# Patient Record
Sex: MALE | Race: WHITE | NOT HISPANIC OR LATINO | Employment: FULL TIME | URBAN - METROPOLITAN AREA
[De-identification: names, ages, dates, MRNs, and addresses within clinical notes are randomized per-mention and may not be internally consistent; named-entity substitution may affect disease eponyms.]

---

## 2021-06-21 ENCOUNTER — TRANSFERRED RECORDS (OUTPATIENT)
Dept: HEALTH INFORMATION MANAGEMENT | Facility: CLINIC | Age: 56
End: 2021-06-21

## 2022-06-21 ENCOUNTER — TRANSFERRED RECORDS (OUTPATIENT)
Dept: HEALTH INFORMATION MANAGEMENT | Facility: CLINIC | Age: 57
End: 2022-06-21

## 2022-06-22 ENCOUNTER — TRANSCRIBE ORDERS (OUTPATIENT)
Dept: OTHER | Age: 57
End: 2022-06-22

## 2022-06-22 DIAGNOSIS — T83.9XXA DIFFICULT FOLEY CATHETER PLACEMENT (H): Primary | ICD-10-CM

## 2022-06-23 ENCOUNTER — TELEPHONE (OUTPATIENT)
Dept: UROLOGY | Facility: CLINIC | Age: 57
End: 2022-06-23

## 2022-06-23 NOTE — TELEPHONE ENCOUNTER
[facility] Call Center    Phone Message    May a detailed message be left on voicemail: yes     Reason for Call: Other: Patient is being referred to Urology for Difficult Gutierrez catheter placement , please triage and call patient to schedule      Action Taken: Message routed to:  [facility] ([facility] Urology     Travel Screening: Not Applicable

## 2022-07-20 ENCOUNTER — TRANSFERRED RECORDS (OUTPATIENT)
Dept: HEALTH INFORMATION MANAGEMENT | Facility: CLINIC | Age: 57
End: 2022-07-20

## 2022-08-01 NOTE — TELEPHONE ENCOUNTER
Palak from referring providers office called and stated pt is still waiting on a call back from referral - please call pt to further discuss, thanks!

## 2022-08-02 NOTE — TELEPHONE ENCOUNTER
I called and left a detailed VM that since [place] this appointment needs to be in person and not a video visit. Asked the patient to call back and get scheduled with Dr Kentrell Corey.

## 2022-08-03 ENCOUNTER — PRE VISIT (OUTPATIENT)
Dept: UROLOGY | Facility: CLINIC | Age: 57
End: 2022-08-03

## 2022-08-03 NOTE — TELEPHONE ENCOUNTER
Action August 3, 2022 JTv 2:09 PM    Action Taken CSS sent an urgent request to [facility] for records.      Action 2022 JTV 8:08 AM    Action Taken CSS received records from [facility].  Records have been sent to scanning for processing. Copies of records sent to provider's email for review. Rooming Staff and Nurse was CC'd in the email as well.        MEDICAL RECORDS REQUEST   [facility]  Urology Clinic  [address] [place] [address]  PHONE: [phone]  Fax: [phone]        FUTURE VISIT INFORMATION                                                   Roshan T Dayanna, : 1965 scheduled for future visit at [facility]    APPOINTMENT INFORMATION:    Date: 2022    Provider:  Kentrell Corey MD    Reason for Visit/Diagnosis: Difficult Gutierrez catheter placement     REFERRAL INFORMATION:    Referring provider:  Dr Leroy Hagen @ [facility]    Clinic contact number:  Phone [phone], Fax [phone]    RECORDS REQUESTED FOR VISIT                                                     NOTES  STATUS/DETAILS   OFFICE NOTE from referring provider Sent to scanning and provider's email on 2022 Yes, 2022, 2022 -- [facility]   Cystoscopy  Yes, 2022 -- [facility]    MEDICATION LIST  Yes, [facility]      PRE-VISIT CHECKLIST      Record collection complete yes   Appointment appropriately scheduled           (right time/right provider) Yes   Joint diagnostic appointment coordinated correctly          (ensure right order & amount of time) Yes   MyChart activation No   Questionnaire complete If no, please explain pending

## 2022-08-04 ENCOUNTER — PRE VISIT (OUTPATIENT)
Dept: UROLOGY | Facility: CLINIC | Age: 57
End: 2022-08-04

## 2022-08-04 NOTE — TELEPHONE ENCOUNTER
Reason for visit: Consult     Dx/Hx/Sx: Difficult Gutierrez catheter placement    Records/imaging/labs/orders: In EPIC    At Rooming: cath supplies?

## 2022-11-12 NOTE — PROGRESS NOTES
"HPI:  Roshan Alan is a 57 year old male with shunted SB being seen for evaluation. He has a a catheterizable channel and has had difficulty cathing.      Cysto in June at OSH by Dr. Mahajan showed a tortuous channel and catheter had to be placed over a wire     Around age 13 - ileal conduit with Dr. Flores in [place] for \"bladder spasticity\"   Around age 25 - Dr. Blankenship put in AUS which was removed due to unclear reasons  Cystoprostatectomy, pouch creation with continent catheterizable channel in 1998     Had been doing well cathing through his pouch. Then a few months ago developed difficulty with CIC. Eventually could not get any catheter in   Now getting cath changes over a wire once per week     Previously cathing q3-4 hours, could hold up to 1L   cathing with 18fr catheters   Irrigates BID due to mucus   On loperamide for diarrhea, says he has short amount of intestine which causes diarrhea     PSH:   As above plus had a bowel obstruction at one point requiring open repair     Exam:  BP (!) 157/97   Pulse 96   Ht 1.626 m (5' 4\")   Wt 63.5 kg (140 lb)   BMI 24.03 kg/m    GENERAL: Healthy, alert and no distress  EYES: Eyes grossly normal to inspection.  No discharge or erythema  RESP: No audible wheeze, cough, or visible cyanosis.  NEURO: Alert and oriented x3  ABD: several well healed scars from prior surgeries including a low midline, and a low horizontal scar   :  catheter through RLQ catheterizable channel       Assessment & Plan   1. 57yoM shunted SB with urologic history that sounds like cystoprostatectomy, and urinary pouch with CCC. He is now having difficulty with getting his catheters in due to tortuosity of the channel and possible false passage. Last cath exchange was last week    We discussed that we would like the channel to rest before deciding how to revise it as having a catheter through it can cover up some of the problem. We will place SPT and cysto the channel at the " same time. He has a recent CT scan so we will need to see the images for this to see if there is a good window     - schedule cysto/SPT placement   - after this will need channel rest and then re-scope. Then can plan revision of the channel   - obtain outside CT images    Kentrell Corey MD  [facility] [place]      ==========================      Additional Coding Information:    Problems:  4 -- one undiagnosed new problem with uncertain prognosis    Data Reviewed  Review of external notes as documented above     Level of risk:  4 -- minor surgery with patient or procedure risks    Time spent:  35 minutes spent on the date of the encounter doing chart review, history and exam, documentation and further activities per the note

## 2022-11-14 ENCOUNTER — OFFICE VISIT (OUTPATIENT)
Dept: UROLOGY | Facility: CLINIC | Age: 57
End: 2022-11-14
Payer: MEDICARE

## 2022-11-14 VITALS
HEART RATE: 96 BPM | WEIGHT: 140 LBS | SYSTOLIC BLOOD PRESSURE: 157 MMHG | DIASTOLIC BLOOD PRESSURE: 97 MMHG | HEIGHT: 64 IN | BODY MASS INDEX: 23.9 KG/M2

## 2022-11-14 DIAGNOSIS — N31.9 NEUROGENIC BLADDER: ICD-10-CM

## 2022-11-14 DIAGNOSIS — Q05.4 SPINA BIFIDA WITH HYDROCEPHALUS, UNSPECIFIED SPINAL REGION (H): ICD-10-CM

## 2022-11-14 PROBLEM — Q05.9 SPINA BIFIDA (H): Status: ACTIVE | Noted: 2022-11-14

## 2022-11-14 PROCEDURE — 99204 OFFICE O/P NEW MOD 45 MIN: CPT | Performed by: STUDENT IN AN ORGANIZED HEALTH CARE EDUCATION/TRAINING PROGRAM

## 2022-11-14 ASSESSMENT — PAIN SCALES - GENERAL: PAINLEVEL: NO PAIN (0)

## 2022-11-14 NOTE — LETTER
"11/14/2022       RE: Roshan Alan  [address]  [place] [address]     Dear Colleague,    Thank you for referring your patient, Roshan Alan, to the [facility] UROLOG[facility]IC [place] at [facility]. Please see a copy of my visit note below.    HPI:  Roshan Alan is a 57 year old male with shunted SB being seen for evaluation. He has a a catheterizable channel and has had difficulty cathing.      Cysto in June at OSH by Dr. Mahajan showed a tortuous channel and catheter had to be placed over a wire     Around age 13 - ileal conduit with Dr. Flores in [place] for \"bladder spasticity\"   Around age 25 - Dr. Blankenship put in AUS which was removed due to unclear reasons  Cystoprostatectomy, pouch creation with continent catheterizable channel in 1998     Had been doing well cathing through his pouch. Then a few months ago developed difficulty with CIC. Eventually could not get any catheter in   Now getting cath changes over a wire once per week     Previously cathing q3-4 hours, could hold up to 1L   cathing with 18fr catheters   Irrigates BID due to mucus   On loperamide for diarrhea, says he has short amount of intestine which causes diarrhea     PSH:   As above plus had a bowel obstruction at one point requiring open repair     Exam:  BP (!) 157/97   Pulse 96   Ht 1.626 m (5' 4\")   Wt 63.5 kg (140 lb)   BMI 24.03 kg/m    GENERAL: Healthy, alert and no distress  EYES: Eyes grossly normal to inspection.  No discharge or erythema  RESP: No audible wheeze, cough, or visible cyanosis.  NEURO: Alert and oriented x3  ABD: several well healed scars from prior surgeries including a low midline, and a low horizontal scar   :  catheter through RLQ catheterizable channel       Assessment & Plan   1. 57yoM shunted SB with urologic history that sounds like cystoprostatectomy, and urinary pouch with CCC. He is now having difficulty with getting his " catheters in due to tortuosity of the channel and possible false passage. Last cath exchange was last week    We discussed that we would like the channel to rest before deciding how to revise it as having a catheter through it can cover up some of the problem. We will place SPT and cysto the channel at the same time. He has a recent CT scan so we will need to see the images for this to see if there is a good window     - schedule cysto/SPT placement   - after this will need channel rest and then re-scope. Then can plan revision of the channel   - obtain outside CT images    Kentrell Corey MD  [facility]OLOGY [facility]INIC [place]      ==========================      Additional Coding Information:    Problems:  4 -- one undiagnosed new problem with uncertain prognosis    Data Reviewed  Review of external notes as documented above     Level of risk:  4 -- minor surgery with patient or procedure risks    Time spent:  35 minutes spent on the date of the encounter doing chart review, history and exam, documentation and further activities per the note

## 2022-11-14 NOTE — NURSING NOTE
"Chief Complaint   Patient presents with     Consult       Blood pressure (!) 157/97, pulse 96, height 1.626 m (5' 4\"), weight 63.5 kg (140 lb). Body mass index is 24.03 kg/m .    There is no problem list on file for this patient.      No Known Allergies    Current Outpatient Medications   Medication Sig Dispense Refill     omeprazole (PRILOSEC) 20 MG DR capsule Take 20 mg by mouth         Social History     Tobacco Use     Smoking status: Every Day     Types: Cigarettes, Vaping Device     Smokeless tobacco: Former       Ailyn Goldsmith  11/14/2022  3:34 PM  "

## 2022-11-15 ENCOUNTER — DOCUMENTATION ONLY (OUTPATIENT)
Dept: UROLOGY | Facility: CLINIC | Age: 57
End: 2022-11-15

## 2022-11-15 NOTE — PROGRESS NOTES
Action 11/15/22 MMT   Action Taken  [facility] faxed urgent request to Buddy Richard for OP notes and imaging.     CSS faxed urgent request to Luba Wiggins to push images to PACS.

## 2023-09-12 ENCOUNTER — TELEPHONE (OUTPATIENT)
Dept: UROLOGY | Facility: CLINIC | Age: 58
End: 2023-09-12
Payer: MEDICARE

## 2023-09-12 DIAGNOSIS — N31.9 NEUROGENIC BLADDER: Primary | ICD-10-CM

## 2023-09-12 NOTE — TELEPHONE ENCOUNTER
Scheduled surgery with Dr. Trevino on 10/11    Spoke with: Patient    Surgery is located at [facility]    Patient will be seen for their H&P by:    PCP Dr. Lázaro Toledo within 30 days of surgery - added to care team, would not let me add as PCP    Anesthesia type: General      Requested Imaging required for surgery: NA    Patient needs scheduled for 4 weeks with kwame or fellow for office cysto    Patient will receive their packet via mail per their preference - Confirmed address on file is up to date    Additional comments: JESENIA Horton on 9/12/2023 at 3:38 PM

## 2023-09-25 DIAGNOSIS — N31.9 NEUROGENIC BLADDER: Primary | ICD-10-CM

## 2023-09-27 ENCOUNTER — TRANSFERRED RECORDS (OUTPATIENT)
Dept: HEALTH INFORMATION MANAGEMENT | Facility: CLINIC | Age: 58
End: 2023-09-27
Payer: MEDICARE

## 2023-10-05 ENCOUNTER — TELEPHONE (OUTPATIENT)
Dept: UROLOGY | Facility: CLINIC | Age: 58
End: 2023-10-05
Payer: MEDICARE

## 2023-10-05 ENCOUNTER — TRANSFERRED RECORDS (OUTPATIENT)
Dept: HEALTH INFORMATION MANAGEMENT | Facility: CLINIC | Age: 58
End: 2023-10-05
Payer: MEDICARE

## 2023-10-05 LAB
CREATININE (EXTERNAL): 0.6 MG/DL (ref 0.7–1.3)
GLUCOSE (EXTERNAL): 87 MG/DL (ref 70–99)
POTASSIUM (EXTERNAL): 4.1 MMOL/L (ref 3.5–5.1)

## 2023-10-05 RX ORDER — LOPERAMIDE HYDROCHLORIDE 2 MG/1
2 TABLET ORAL 2 TIMES DAILY PRN
COMMUNITY

## 2023-10-05 NOTE — TELEPHONE ENCOUNTER
Spoke with Roshan regarding pre-op physical and urine culture.  He completed his physical today and has been cleared for surgery, those records have been requested.  His urine culture has had no growth.  He stated that he understood all of the information in the surgery packet, and did not have any other questions regarding surgery prep.  This writer let him know that scheduling will reach out to him a couple days before surgery to confirm the surgery time.  Roshan has my direct number if her has any other questions or concerns.      Maureen Ingram RN   8:35 AM

## 2023-10-10 ENCOUNTER — ANESTHESIA EVENT (OUTPATIENT)
Dept: SURGERY | Facility: AMBULATORY SURGERY CENTER | Age: 58
End: 2023-10-10
Payer: MEDICARE

## 2023-10-10 NOTE — ANESTHESIA PREPROCEDURE EVALUATION
Anesthesia Pre-Procedure Evaluation    Patient: Roshan Alan   MRN: [id number] : 1965        Procedure : Procedure(s):  CYSTOSCOPY, WITH SUPRAPUBIC CATHETER INSERTION          No past medical history on file.   No past surgical history on file.   No Known Allergies   Social History     Tobacco Use    Smoking status: Every Day     Types: Cigarettes, Vaping Device    Smokeless tobacco: Former   Substance Use Topics    Alcohol use: Not on file      Wt Readings from Last 1 Encounters:   22 63.5 kg (140 lb)           Physical Exam    Airway        Mallampati: II   TM distance: > 3 FB   Neck ROM: full   Mouth opening: > 3 cm    Respiratory Devices and Support         Dental       (+) Minor Abnormalities - some fillings, tiny chips      Cardiovascular   cardiovascular exam normal          Pulmonary   pulmonary exam normal                OUTSIDE LABS:  CBC: No results found for: WBC, HGB, HCT, PLT  BMP: No results found for: NA, POTASSIUM, CHLORIDE, CO2, BUN, CR, GLC  COAGS: No results found for: PTT, INR, FIBR  POC: No results found for: BGM, HCG, HCGS  HEPATIC: No results found for: ALBUMIN, PROTTOTAL, ALT, AST, GGT, ALKPHOS, BILITOTAL, BILIDIRECT, JACLYN  OTHER: No results found for: PH, LACT, A1C, DRU, PHOS, MAG, LIPASE, AMYLASE, TSH, T4, T3, CRP, SED    Anesthesia Plan    ASA Status:  2    NPO Status:  NPO Appropriate    Anesthesia Type: MAC.     - Reason for MAC: straight local not clinically adequate   Induction: Intravenous, Propofol.   Maintenance: TIVA.        Consents    Anesthesia Plan(s) and associated risks, benefits, and realistic alternatives discussed. Questions answered and patient/representative(s) expressed understanding.     - Discussed:     - Discussed with:  Patient      - Extended Intubation/Ventilatory Support Discussed: No.      - Patient is DNR/DNI Status: No     Use of blood products discussed: No .     Postoperative Care    Pain management: IV analgesics, Oral pain medications,  Multi-modal analgesia.   PONV prophylaxis: Dexamethasone or Solumedrol, Ondansetron (or other 5HT-3), Background Propofol Infusion     Comments:           H&P reviewed: Unable to attach H&P to encounter due to EHR limitations. H&P Update: appropriate H&P reviewed, patient examined. No interval changes since H&P (within 30 days).         Isaac Villagomez MD

## 2023-10-11 ENCOUNTER — HOSPITAL ENCOUNTER (OUTPATIENT)
Facility: AMBULATORY SURGERY CENTER | Age: 58
Discharge: HOME | End: 2023-10-11
Attending: UROLOGY
Payer: MEDICARE

## 2023-10-11 ENCOUNTER — ANESTHESIA (OUTPATIENT)
Dept: SURGERY | Facility: AMBULATORY SURGERY CENTER | Age: 58
End: 2023-10-11
Payer: MEDICARE

## 2023-10-11 VITALS
DIASTOLIC BLOOD PRESSURE: 73 MMHG | BODY MASS INDEX: 23.9 KG/M2 | OXYGEN SATURATION: 99 % | RESPIRATION RATE: 14 BRPM | HEIGHT: 64 IN | HEART RATE: 85 BPM | TEMPERATURE: 97.6 F | WEIGHT: 140 LBS | SYSTOLIC BLOOD PRESSURE: 117 MMHG

## 2023-10-11 DIAGNOSIS — N31.9 NEUROGENIC BLADDER: Primary | ICD-10-CM

## 2023-10-11 PROCEDURE — 51102 DRAIN BL W/CATH INSERTION: CPT

## 2023-10-11 PROCEDURE — 51102 DRAIN BL W/CATH INSERTION: CPT | Mod: 22 | Performed by: UROLOGY

## 2023-10-11 PROCEDURE — 76942 ECHO GUIDE FOR BIOPSY: CPT | Mod: 26 | Performed by: UROLOGY

## 2023-10-11 RX ORDER — OXYCODONE HYDROCHLORIDE 5 MG/1
10 TABLET ORAL
Status: DISCONTINUED | OUTPATIENT
Start: 2023-10-11 | End: 2023-10-12 | Stop reason: HOSPADM

## 2023-10-11 RX ORDER — HYDROCODONE BITARTRATE AND ACETAMINOPHEN 5; 325 MG/1; MG/1
1 TABLET ORAL ONCE
Status: CANCELLED | OUTPATIENT
Start: 2023-10-11 | End: 2023-10-11

## 2023-10-11 RX ORDER — SODIUM CHLORIDE, SODIUM LACTATE, POTASSIUM CHLORIDE, CALCIUM CHLORIDE 600; 310; 30; 20 MG/100ML; MG/100ML; MG/100ML; MG/100ML
INJECTION, SOLUTION INTRAVENOUS CONTINUOUS
Status: DISCONTINUED | OUTPATIENT
Start: 2023-10-11 | End: 2023-10-12 | Stop reason: HOSPADM

## 2023-10-11 RX ORDER — CEFAZOLIN SODIUM 2 G/50ML
2 SOLUTION INTRAVENOUS SEE ADMIN INSTRUCTIONS
Status: DISCONTINUED | OUTPATIENT
Start: 2023-10-11 | End: 2023-10-12 | Stop reason: HOSPADM

## 2023-10-11 RX ORDER — DEXAMETHASONE SODIUM PHOSPHATE 4 MG/ML
INJECTION, SOLUTION INTRA-ARTICULAR; INTRALESIONAL; INTRAMUSCULAR; INTRAVENOUS; SOFT TISSUE PRN
Status: DISCONTINUED | OUTPATIENT
Start: 2023-10-11 | End: 2023-10-11

## 2023-10-11 RX ORDER — OXYCODONE HYDROCHLORIDE 5 MG/1
5 TABLET ORAL
Status: COMPLETED | OUTPATIENT
Start: 2023-10-11 | End: 2023-10-11

## 2023-10-11 RX ORDER — ONDANSETRON 2 MG/ML
4 INJECTION INTRAMUSCULAR; INTRAVENOUS EVERY 30 MIN PRN
Status: DISCONTINUED | OUTPATIENT
Start: 2023-10-11 | End: 2023-10-12 | Stop reason: HOSPADM

## 2023-10-11 RX ORDER — ACETAMINOPHEN 325 MG/1
975 TABLET ORAL ONCE
Status: COMPLETED | OUTPATIENT
Start: 2023-10-11 | End: 2023-10-11

## 2023-10-11 RX ORDER — ONDANSETRON 4 MG/1
4 TABLET, ORALLY DISINTEGRATING ORAL EVERY 30 MIN PRN
Status: DISCONTINUED | OUTPATIENT
Start: 2023-10-11 | End: 2023-10-12 | Stop reason: HOSPADM

## 2023-10-11 RX ORDER — LIDOCAINE HYDROCHLORIDE AND EPINEPHRINE 10; 10 MG/ML; UG/ML
INJECTION, SOLUTION INFILTRATION; PERINEURAL PRN
Status: DISCONTINUED | OUTPATIENT
Start: 2023-10-11 | End: 2023-10-11 | Stop reason: HOSPADM

## 2023-10-11 RX ORDER — PROPOFOL 10 MG/ML
INJECTION, EMULSION INTRAVENOUS CONTINUOUS PRN
Status: DISCONTINUED | OUTPATIENT
Start: 2023-10-11 | End: 2023-10-11

## 2023-10-11 RX ORDER — CEFAZOLIN SODIUM 2 G/50ML
2 SOLUTION INTRAVENOUS
Status: COMPLETED | OUTPATIENT
Start: 2023-10-11 | End: 2023-10-11

## 2023-10-11 RX ORDER — ONDANSETRON 2 MG/ML
INJECTION INTRAMUSCULAR; INTRAVENOUS PRN
Status: DISCONTINUED | OUTPATIENT
Start: 2023-10-11 | End: 2023-10-11

## 2023-10-11 RX ORDER — LIDOCAINE 40 MG/G
CREAM TOPICAL
Status: DISCONTINUED | OUTPATIENT
Start: 2023-10-11 | End: 2023-10-12 | Stop reason: HOSPADM

## 2023-10-11 RX ADMIN — SODIUM CHLORIDE, SODIUM LACTATE, POTASSIUM CHLORIDE, CALCIUM CHLORIDE: 600; 310; 30; 20 INJECTION, SOLUTION INTRAVENOUS at 06:30

## 2023-10-11 RX ADMIN — CEFAZOLIN SODIUM 2 G: 2 SOLUTION INTRAVENOUS at 07:25

## 2023-10-11 RX ADMIN — DEXAMETHASONE SODIUM PHOSPHATE 4 MG: 4 INJECTION, SOLUTION INTRA-ARTICULAR; INTRALESIONAL; INTRAMUSCULAR; INTRAVENOUS; SOFT TISSUE at 07:36

## 2023-10-11 RX ADMIN — ONDANSETRON 4 MG: 2 INJECTION INTRAMUSCULAR; INTRAVENOUS at 07:36

## 2023-10-11 RX ADMIN — OXYCODONE HYDROCHLORIDE 5 MG: 5 TABLET ORAL at 08:40

## 2023-10-11 RX ADMIN — PROPOFOL 150 MCG/KG/MIN: 10 INJECTION, EMULSION INTRAVENOUS at 07:35

## 2023-10-11 NOTE — ANESTHESIA CARE TRANSFER NOTE
Patient: Roshan Alan    Procedure: Procedure(s):  CYSTOSCOPY, WITH SUPRAPUBIC CATHETER INSERTION       Diagnosis: Neurogenic bladder [N31.9]  Diagnosis Additional Information: No value filed.    Anesthesia Type:   MAC     Note:    Oropharynx: spontaneously breathing and oropharynx clear of all foreign objects  Level of Consciousness: awake  Oxygen Supplementation: room air    Independent Airway: airway patency satisfactory and stable  Dentition: dentition unchanged  Vital Signs Stable: post-procedure vital signs reviewed and stable  Report to RN Given: handoff report given  Patient transferred to: Phase II  Comments: Uneventful transport   Report to OLLIE Zurita  Exchanging well; color natl  Pt responds appropriately to command  IV patent  Lips/teeth/dentition as preop status  Questions answered      Handoff Report: Identifed the Patient, Identified the Reponsible Provider, Reviewed the pertinent medical history, Discussed the surgical course, Reviewed Intra-OP anesthesia mangement and issues during anesthesia, Set expectations for post-procedure period and Allowed opportunity for questions and acknowledgement of understanding      Vitals:  Vitals Value Taken Time   /73 0833   Temp 97.3    Pulse 76    Resp 16    SpO2 98% room air        Electronically Signed By: DONAL POWER CRNA  October 11, 2023  8:35 AM

## 2023-10-11 NOTE — DISCHARGE INSTRUCTIONS
[facility]  Home Care Following Anesthesia  For 24 hours after surgery:  Get plenty of rest.  A responsible adult must stay with you for at least 24 hours after you leave the surgery center.  Do not drive or use heavy equipment.  If you have weakness or tingling, don't drive or use heavy equipment until this feeling goes away.   Do not drink alcohol.   Avoid strenuous or risky activities.  Ask for help when climbing stairs.  You may feel lightheaded.  IF so, sit for a few minutes before standing.  Have someone help you get up.   If you have nausea (feel sick to your stomach): Drink only clear liquids such as apple juice, ginger ale, broth or 7-Up.  Rest may also help.  Be sure to drink enough fluids.  Move to a regular diet as you feel able.   You may have a slight fever.  Call the doctor if your fever is over 100 F (37.7 C) (taken under the tongue) or lasts longer than 24 hours.  You may have a dry mouth, a sore throat, muscle aches or trouble sleeping. These should go away after 24 hours.  Do not make important or legal decisions.   It is recommended to avoid smoking.               Tips for taking pain medications  To get the best pain relief possible, remember these points:  Take pain medications as directed, before pain becomes severe.  Pain medication can upset your stomach: taking it with food may help.  Constipation is a common side effect of pain medication. Drink plenty of  fluids.  Eat foods high in fiber. Take a stool softener if recommended by your doctor or pharmacist.  Do not drink alcohol, drive or operate machinery while taking pain medications.  Ask about other ways to control pain, such as with heat, ice or relaxation.    Tylenol/Acetaminophen Consumption    If you feel your pain relief is insufficient, you may take Tylenol/Acetaminophen in addition to your narcotic pain medication.   Be careful not to exceed 4,000 mg of Tylenol/Acetaminophen in a 24 hour  period from all sources.  If you are taking extra strength Tylenol/acetaminophen (500 mg), the maximum dose is 8 tablets in 24 hours.  If you are taking regular strength acetaminophen (325 mg), the maximum dose is 12 tablets in 24 hours.    Call a doctor for any of the following:  Signs of infection (fever, growing tenderness at the surgery site, a large amount of drainage or bleeding, severe pain, foul-smelling drainage, redness, swelling).  It has been over 8 to 10 hours since surgery and you are still not able to urinate (pass water).  Headache for over 24 hours.  Signs of Covid-19 infection (temperature over 100 degrees, shortness of breath, cough, loss of taste/smell, generalized body aches, persistent headache, chills, sore throat, nausea/vomiting/diarrhea)  Your doctor is:  Dr. Todd Barrientos, Prostate and Urology: [phone]              Or dial [phone] and ask for the resident on call for:  Prostate Urology  For emergency care, call the:  [facility] Department:  [phone] (TTY for hearing impaired: [phone])

## 2023-10-11 NOTE — OR NURSING
Catheter removed from right lower abdomen per Dr. Barrientos no new catheter placed in this location

## 2023-10-11 NOTE — OP NOTE
PREOPERATIVE DIAGNOSIS: neurogenic bladder and difficulty catheterizing efferent limb of [place] Pouch.   POSTOPERATIVE DIAGNOSIS: same  PROCEDURE PERFORMED: Suprapubic cystostomy tube placement (930275-88) Pelvic Ultrasound with focus on the bladder (48083-31) Cystoscopy (97593-86)    MODIFIER 22: cysto was made more difficult by extremely redundant cath channel such that it took almost 20 minutes to find the bladder -- 10 times longer than comparable cases. SPT placement made more difficult by tremendous amount of mucous in the bladder whichmade it very difficult to visualize the interior of the pouch on either cysto or ultrasound and these made visualization for SPT placement much more difficult -- taking 3x longer than normal.   SURGEON: Todd Trevino MD  RESIDENT: none  ESTIMATED BLOOD LOSS: Minimal, less than 5 mL.   TUBES AND DRAINS: A 16-Tamazight Gutierrez catheter with 10 mL and balloon.   COMPLICATIONS: None.   ANESTHESIA: MAC and Local  BRIEF HISTORY OF PRESENT ILLNESS: neurogenic bladder s/p ileal conduit and then conduit removal, cystectomy, with creation of continent pouch. Now has 1-2 years of difficult catheterizing the pouch which has required him to have an indwelling catheter in the channel exchanged over a wire.     We discussed options, and recommendations for suprapubic tube for bladder drainage. Patient understood risks (including bowel or vascular injury), benefits and alternatives and agreed to proceed.    DESCRIPTION OF PROCEDURE: After obtaining informed consent, correctly identifying and marking the patient and confirming, preoperative antibiotics were given. he was brought back to the operating room table, placed supine where MAC anesthesia was induced. he was then prepped and draped in the standard sterile fashion.   Flexible cystoscopy was performed through the urethra and demonstrated intact prostate and closed BN. Urethra was normal. Cysto was then done through the channel. There was no  visible stricture, just a very redundant channel. There was one edge of a staple in the channel, suggesting that this was, indeed , a staple-tapered ileal limb. There was a suggestion of an IC valve too. It took 20 minutes for me to find the bladder due to the multiple twists and turns in the channel. The channel is very long. Once in the bladder there was a tremendous amount of mucous that made visualization very difficult. I worked for several minutes to evacuate mucous with a syringe and the scope but the scope channel kept clogging. AThis meant that eventually I had to abandon cystoscopic visualization for the SPT placement and rely only on US.    An abdominal ultrasound was then performed to localize the pouch and confirm there was no intervening bowel. The pelvic vasculature was identified.  The pouch was identified and but visualization of the finer structures of the pouch was difficult due to the tremendous amount of mucous.  There is no intervening bowel between the bladder wall and the rectus muscles which were also identified.  After the bladder was filled with sterile saline and the skin was injected with local anesthesia, we made a 1 centimeter incision with #11 blade scalpel approximately 2 fingerbreadths above the pubic bone.  We then used a spinal needle to find a good trajectory under ultrasonic guidance into the pouch. Once we isolated this trajectory, we then inserted the Henry trocar in through the incision until urine returned. We then removed the inner cannula of the trocar and placed the 16-Swedish Gutierrez catheter through the trocar and inflated the catheter with 10 mL in the balloon. Dressing was placed around the suprapubic catheter and a drainage bag was attached. The catheter was secured to his abdomen. The patient was awakened from anesthesia in stable condition.     Plan will be to cysto the channel in 6 weeks. I suspect we can correct his channel with a small laparotomy and reducing  the redundancy.

## 2023-10-11 NOTE — ANESTHESIA POSTPROCEDURE EVALUATION
Patient: Roshan Alan    Procedure: Procedure(s):  CYSTOSCOPY, WITH SUPRAPUBIC CATHETER INSERTION       Anesthesia Type:  MAC    Note:  Disposition: Outpatient   Postop Pain Control: Uneventful            Sign Out: Well controlled pain   PONV: No   Neuro/Psych: Uneventful            Sign Out: Acceptable/Baseline neuro status   Airway/Respiratory: Uneventful            Sign Out: Acceptable/Baseline resp. status   CV/Hemodynamics: Uneventful            Sign Out: Acceptable CV status; No obvious hypovolemia; No obvious fluid overload   Other NRE:    DID A NON-ROUTINE EVENT OCCUR?            Last vitals:  Vitals Value Taken Time   /73 10/11/23 0902   Temp 36.4  C (97.6  F) 10/11/23 0902   Pulse     Resp 14 10/11/23 0902   SpO2 99 % 10/11/23 0902       Electronically Signed By: Isaac Villagomez MD  October 11, 2023  11:52 AM

## 2023-10-13 ENCOUNTER — TELEPHONE (OUTPATIENT)
Dept: UROLOGY | Facility: CLINIC | Age: 58
End: 2023-10-13
Payer: MEDICARE

## 2023-10-13 NOTE — TELEPHONE ENCOUNTER
[facility] Call Center    Phone Message    May a detailed message be left on voicemail: yes     Reason for Call: Symptoms or Concerns     If patient has red-flag symptoms, warm transfer to triage line    Current symptom or concern: The patient called stating he is not able to fully empty his bladder without irrigating first. This started after his procedure. [facility] went to [facility] and they helped irrigate the first time. Please contact patient. Thank you.    Symptoms have been present for:  2 day(s)    Has patient previously been seen for this? Yes    By: Uriel    Date: 10/11/23    Are there any new or worsening symptoms? No    Action Taken: Message routed to:  [facility] ([facility] Uro    Travel Screening: Not Applicable

## 2023-10-20 NOTE — TELEPHONE ENCOUNTER
Spoke with Roshan.  He stated that everything is going much better and he has not had any further issues.    Maureen Ingram RN   2:33 PM

## 2023-10-22 ENCOUNTER — HEALTH MAINTENANCE LETTER (OUTPATIENT)
Age: 58
End: 2023-10-22

## 2023-10-30 ENCOUNTER — PRE VISIT (OUTPATIENT)
Dept: UROLOGY | Facility: CLINIC | Age: 58
End: 2023-10-30
Payer: MEDICARE

## 2023-10-30 DIAGNOSIS — Q05.4 SPINA BIFIDA WITH HYDROCEPHALUS, UNSPECIFIED SPINAL REGION (H): ICD-10-CM

## 2023-10-30 DIAGNOSIS — N31.9 NEUROGENIC BLADDER: Primary | ICD-10-CM

## 2023-10-30 NOTE — TELEPHONE ENCOUNTER
Reason for visit: Cystoscopy - verify prep with provider     Relevant information: [place] pouch with redundant channel, SP tube    Records/imaging/labs/orders: all records available    Pt called: no need for a call    At Rooming: irrigation supplies, catheter for exchange, verify catheter size    Kimber Gutiérrez CMA  10/30/2023  1:20 PM

## 2023-10-31 ENCOUNTER — TELEPHONE (OUTPATIENT)
Dept: UROLOGY | Facility: CLINIC | Age: 58
End: 2023-10-31
Payer: MEDICARE

## 2023-10-31 NOTE — TELEPHONE ENCOUNTER
Left message to assist in scheduling later cysto. Dr. Gotti advised pt scheduled too soon, provided date and time 11/20 @ 12:45pm, also provided direct number and urology scheduling number for pt to call back

## 2023-10-31 NOTE — TELEPHONE ENCOUNTER
[facility] Call Center    Phone Message    May a detailed message be left on voicemail: no     Reason for Call: Other: Patient called back letting us know that he will take that appointment on 11/20. Writer not able to access time slot for appointment. Please schedule patient for that day and time.     Action Taken: Message routed to:  [facility] ([facility] Urology    Travel Screening: Not Applicable

## 2023-10-31 NOTE — TELEPHONE ENCOUNTER
----- Message from Kimber Gutiérrez, EVELIN sent at 10/30/2023  1:10 PM CDT -----  Regarding: Please call to reschedule  Please move Roshan to 11/20/23 at 12:45 with Niesha Gotti, he is scheduled too soon per provider.    Thank you,  Kimber

## 2023-11-17 RX ORDER — LIDOCAINE HYDROCHLORIDE 20 MG/ML
JELLY TOPICAL ONCE
Status: DISCONTINUED | OUTPATIENT
Start: 2023-11-20 | End: 2023-11-20

## 2023-11-20 ENCOUNTER — OFFICE VISIT (OUTPATIENT)
Dept: UROLOGY | Facility: CLINIC | Age: 58
End: 2023-11-20
Payer: MEDICARE

## 2023-11-20 VITALS
SYSTOLIC BLOOD PRESSURE: 137 MMHG | HEIGHT: 64 IN | WEIGHT: 140 LBS | BODY MASS INDEX: 23.9 KG/M2 | HEART RATE: 79 BPM | DIASTOLIC BLOOD PRESSURE: 92 MMHG

## 2023-11-20 DIAGNOSIS — N31.9 NEUROGENIC BLADDER: Primary | ICD-10-CM

## 2023-11-20 PROCEDURE — 51705 CHANGE OF BLADDER TUBE: CPT | Performed by: STUDENT IN AN ORGANIZED HEALTH CARE EDUCATION/TRAINING PROGRAM

## 2023-11-20 RX ORDER — OXYBUTYNIN CHLORIDE 5 MG/1
5 TABLET ORAL 3 TIMES DAILY
Qty: 90 TABLET | Refills: 0 | Status: SHIPPED | OUTPATIENT
Start: 2023-11-20 | End: 2024-01-16

## 2023-11-20 RX ORDER — CIPROFLOXACIN 500 MG/1
500 TABLET, FILM COATED ORAL ONCE
Status: COMPLETED | OUTPATIENT
Start: 2023-11-20 | End: 2023-11-20

## 2023-11-20 RX ADMIN — CIPROFLOXACIN 500 MG: 500 TABLET, FILM COATED ORAL at 14:06

## 2023-11-20 ASSESSMENT — PAIN SCALES - GENERAL: PAINLEVEL: NO PAIN (0)

## 2023-11-20 NOTE — NURSING NOTE
"Chief Complaint   Patient presents with    Cyst     6 week SP tube exchange         Blood pressure (!) 137/92, pulse 79, height 1.626 m (5' 4\"), weight 63.5 kg (140 lb). Body mass index is 24.03 kg/m .    Patient Active Problem List   Diagnosis    Neurogenic bladder    Spina bifida (H)       No Known Allergies    Current Outpatient Medications   Medication Sig Dispense Refill    loperamide (IMODIUM A-D) 2 MG tablet Take 2 mg by mouth 2 times daily as needed for diarrhea      omeprazole (PRILOSEC) 20 MG DR capsule Take 20 mg by mouth         Social History     Tobacco Use    Smoking status: Every Day     Types: Cigarettes, Vaping Device    Smokeless tobacco: Former       Vanesa Celis MA  11/20/2023  12:58 PM     "

## 2023-11-20 NOTE — NURSING NOTE
"Order has been verified: Yes.    No Known Allergies    The following medication was given:     MEDICATION: Ciprofloxacin  ROUTE: PO  SITE: Medication was given orally  DOSE: 500mg  LOT #: [id number]  : [employer]  EXPIRATION DATE: 2024  NDC#: 2417-7246-36   Was there drug waste? No    Prior to medication administration, verified patient identity using patient's name and date of birth.  Due to medication administration, patient instructed to remain in clinic for 15 minutes  afterwards, and to report any adverse reaction to me immediately.    S/p change; performed by Dr. Gotti.  16 Fr[address] Str[address] Latex duron Catheter, catheter change tray, and flush kit prepared by this author.    Sailaja EDGAR Waterman  23  2:12 PM    Chief Complaint   Patient presents with    Cyst     6 week SP tube exchange         Blood pressure (!) 137/92, pulse 79, height 1.626 m (5' 4\"), weight 63.5 kg (140 lb). Body mass index is 24.03 kg/m .    Patient Active Problem List   Diagnosis    Neurogenic bladder    Spina bifida (H)       No Known Allergies    Current Outpatient Medications   Medication Sig Dispense Refill    loperamide (IMODIUM A-D) 2 MG tablet Take 2 mg by mouth 2 times daily as needed for diarrhea      omeprazole (PRILOSEC) 20 MG DR capsule Take 20 mg by mouth         Social History     Tobacco Use    Smoking status: Every Day     Types: Cigarettes, Vaping Device    Smokeless tobacco: Former       What to expect after the procedure reviewed with patient: Yes    Sailaja Waterman LPN  2023  2:12 PM     Invasive Procedure Safety Checklist:    Procedure: Cystoscopy    Action: Complete sections and checkboxes as appropriate.    Pre-procedure:  1. Patient ID Verified with 2 identifiers (Dorothy and  or MRN) : YES    2. Procedure and site verified with patient/designee (when able) : YES    3. Accurate consent documentation in medical record : YES    4. H&P (or appropriate assessment) documented in medical record : YES  H&P must " be up to 30 days prior to procedure an updated within 24 hours of                 Procedure as applicable.     5. Relevant diagnostic and radiology test results appropriately labeled and displayed as applicable : YES    6. Blood products, implants, devices, and/or special equipment available for the procedure as applicable : YES    7. Procedure site(s) marked with provider initials [Exclusions: None] : NO    8. Marking not required. Reason : Yes  Procedure does not require site marking    Time Out:     Time-Out performed immediately prior to starting procedure, including verbal and active participation of all team members addressing: YES    1. Correct patient identity.  2. Confirmed that the correct side and site are marked.  3. An accurate procedure to be done.  4. Agreement on the procedure to be done.  5. Correct patient position.  6. Relevant images and results are properly labeled and appropriately displayed.  7. The need to administer antibiotics or fluids for irrigation purposes during the procedure as applicable.  8. Safety precautions based on patient history or medication use.    During Procedure: Verification of correct person, site, and procedure occurs any time the responsibility for care of the patient is transferred to another member of the care team.    No medications administered during this procedure.    Sailaja Waterman LPN  November 20, 2023

## 2023-11-20 NOTE — PATIENT INSTRUCTIONS
"AFTER YOUR CYSTOSCOPY        You have just completed a cystoscopy, or \"cysto\", which allowed your physician to learn more about your bladder (or to remove a stent placed after surgery). We suggest that you continue to avoid caffeine, fruit juice, and alcohol for the next 24 hours, however, you are encouraged to return to your normal activities.         A few things that are considered normal after your cystoscopy:     * Small amount of bleeding (or spotting) that clears within the next 24 hours     * Slight burning sensation with urination     * Sensation to of needing to avoid more frequently     * The feeling of \"air\" in your urine     * Mild discomfort that is relieved with Tylenol        Please contact our office promptly if you:     * Develop a fever above 101 degrees     * Are unable to urinate     * Develop bright red blood that does not stop     * Severe pain or swelling         Please contact our office with any concerns or questions @DEPTPHN.  AFTER YOUR CYSTOSCOPY        You have just completed a cystoscopy, or \"cysto\", which allowed your physician to learn more about your bladder (or to remove a stent placed after surgery). We suggest that you continue to avoid caffeine, fruit juice, and alcohol for the next 24 hours, however, you are encouraged to return to your normal activities.         A few things that are considered normal after your cystoscopy:     * Small amount of bleeding (or spotting) that clears within the next 24 hours     * Slight burning sensation with urination     * Sensation to of needing to avoid more frequently     * The feeling of \"air\" in your urine     * Mild discomfort that is relieved with Tylenol        Please contact our office promptly if you:     * Develop a fever above 101 degrees     * Are unable to urinate     * Develop bright red blood that does not stop     * Severe pain or swelling         Please contact our office with any concerns or questions @DEPTPHN.  "

## 2023-11-20 NOTE — LETTER
11/20/2023       RE: Roshan Alan  [address] [facility] / [facility] [place] [address]     Dear Colleague,    Thank you for referring your patient, Roshan Alan, to the [facility] [place] at [facility]. Please see a copy of my visit note below.    Male Cystoscopy Procedure Note     PRE-PROCEDURE DIAGNOSIS: difficulty catheterizing cath channel   POST-PROCEDURE DIAGNOSIS: same  PROCEDURE: cystoscopy    HISTORY: Roshan Alan is a 58 year old man with shunted SB with catheterizable channel. He had difficulty cathing requiring indwelling catheter with changes over a wire weekly. He underwent an SPT placement on 10/11/23 to give channel a rest prior to cysto of channel.     DESCRIPTION OF PROCEDURE:  After informed consent was obtained, the patient was brought to the procedure room where he was placed in the supine position with all pressure points well padded.  The channel and SPT were prepped and draped in a sterile fashion. A flexible cystoscope was introduced through the channel. .There was no visible stricture, just a very redundant long channel that made finding the bladder difficult.  Pouch was free of  diverticuli, tumors or stones.The flexible cystoscope was removed and the findings were described to the patient.   The existing SPT was removed without difficulty and a new 16Fr SPT was placed into the bladder without difficulty and placed to drainage.    ASSESSMENT AND PLAN:  58 year old man with neurogenic bladder s/p ileal conduit and then conduit removal, cystectomy, with creation of continent pouch. Now with difficulty catheterizing the pouch which  required him to have an indwelling catheter in the channel exchanged over a wire, now with indwelling SPT.      We discussed operative approach to correcting his redundancy, as it is likely the cause of his difficult catheterizations.   We recommended robotic laparoscopic lysis of adhesions around the  channel with small laparotomy if necessary and then revision of the channel. We reviewed the risks and benefits of the procedure including but not limited to infection, bleeding, bowel injury, injury to pouch, reoperation. He would like to proceed.     Plan for robotic laparoscopic channel revision next available  SPT to remain at this time, continue irrigations daily.     Again, thank you for allowing me to participate in the care of your patient.      Sincerely,    Scottie Gotti MD

## 2023-11-20 NOTE — PROGRESS NOTES
Male Cystoscopy Procedure Note     PRE-PROCEDURE DIAGNOSIS: difficulty catheterizing cath channel   POST-PROCEDURE DIAGNOSIS: same  PROCEDURE: cystoscopy    HISTORY: Roshan Alan is a 58 year old man with shunted SB with catheterizable channel. He had difficulty cathing requiring indwelling catheter with changes over a wire weekly. He underwent an SPT placement on 10/11/23 to give channel a rest prior to cysto of channel.     DESCRIPTION OF PROCEDURE:  After informed consent was obtained, the patient was brought to the procedure room where he was placed in the supine position with all pressure points well padded.  The channel and SPT were prepped and draped in a sterile fashion. A flexible cystoscope was introduced through the channel. .There was no visible stricture, just a very redundant long channel that made finding the bladder difficult.  Pouch was free of  diverticuli, tumors or stones.The flexible cystoscope was removed and the findings were described to the patient.   The existing SPT was removed without difficulty and a new 16Fr SPT was placed into the bladder without difficulty and placed to drainage.    ASSESSMENT AND PLAN:  58 year old man with neurogenic bladder s/p ileal conduit and then conduit removal, cystectomy, with creation of continent pouch. Now with difficulty catheterizing the pouch which  required him to have an indwelling catheter in the channel exchanged over a wire, now with indwelling SPT.      We discussed operative approach to correcting his redundancy, as it is likely the cause of his difficult catheterizations.   We recommended robotic laparoscopic lysis of adhesions around the channel with small laparotomy if necessary and then revision of the channel. We reviewed the risks and benefits of the procedure including but not limited to infection, bleeding, bowel injury, injury to pouch, reoperation. He would like to proceed.     Plan for robotic laparoscopic channel revision next  available  SPT to remain at this time, continue irrigations daily.

## 2023-11-27 ENCOUNTER — TELEPHONE (OUTPATIENT)
Dept: UROLOGY | Facility: CLINIC | Age: 58
End: 2023-11-27
Payer: MEDICARE

## 2023-11-27 RX ORDER — CEFAZOLIN SODIUM 2 G/50ML
2 SOLUTION INTRAVENOUS SEE ADMIN INSTRUCTIONS
Status: CANCELLED | OUTPATIENT
Start: 2023-11-27

## 2023-11-27 RX ORDER — CEFAZOLIN SODIUM 2 G/50ML
2 SOLUTION INTRAVENOUS
Status: CANCELLED | OUTPATIENT
Start: 2023-11-27

## 2023-11-27 NOTE — TELEPHONE ENCOUNTER
Patient is scheduled for a surgical procedure with Dr. Trevino      Spoke with: Patient via phone      Date of Surgery/Procedure: Thursday January 18, 2024    [facility]      Informed patient they will need an adult : Yes     Pre-op: Yes      H&P: Patient to schedule    Additional imaging/appointments:     Post-op: Monday February 12, 2024     Additional comments:      Surgery packet: [email or web address]     Patient is aware that surgery time is tentative to change and to expect a call 3-1 business days from Pre Admission Nursing for instructions and arrival time

## 2024-01-10 ENCOUNTER — MYC MEDICAL ADVICE (OUTPATIENT)
Dept: UROLOGY | Facility: CLINIC | Age: 59
End: 2024-01-10
Payer: MEDICARE

## 2024-01-10 DIAGNOSIS — N31.9 NEUROGENIC BLADDER: Primary | ICD-10-CM

## 2024-01-11 NOTE — PROGRESS NOTES
Called Roshan to check in regards to pre-op physical and urine culture.  He is having them done tomorrow at 10:30am with his PCP in [place].  This writer will send request for records.  Roshan did not have any questions about his upcoming surgery.  He understands the surgery prep instructions.  He has this writers phone number to call back if he has any questions or concerns.     Maureen Ingram RN   2:05 PM

## 2024-01-11 NOTE — PROGRESS NOTES
PT will have pre-op and uc done with primary care in [place].  Adjug message sent to confirm if appointment was completed yet so we can request records.     Maureen Ingram RN

## 2024-01-15 ENCOUNTER — TRANSFERRED RECORDS (OUTPATIENT)
Dept: HEALTH INFORMATION MANAGEMENT | Facility: CLINIC | Age: 59
End: 2024-01-15
Payer: MEDICARE

## 2024-01-17 ENCOUNTER — ANESTHESIA EVENT (OUTPATIENT)
Dept: SURGERY | Facility: CLINIC | Age: 59
End: 2024-01-17
Payer: MEDICARE

## 2024-01-17 ASSESSMENT — ENCOUNTER SYMPTOMS: SEIZURES: 0

## 2024-01-17 ASSESSMENT — LIFESTYLE VARIABLES: TOBACCO_USE: 1

## 2024-01-17 NOTE — ANESTHESIA PREPROCEDURE EVALUATION
Anesthesia Pre-Procedure Evaluation    Patient: Roshan Alan   MRN: [id number] : 1965        Procedure : Procedure(s):  ROBOTIC ASSISTED LAPAROSCOPIC REVISION OF ILEAL CONTINENT CATHETERIZABLE CHANNEL Latex Free          No past medical history on file.   Past Surgical History:   Procedure Laterality Date    CYSTOSCOPY FLEXIBLE, CYSTOSTOMY, INSERT TUBE SUPRAPUBIC, COMBINED N/A 10/11/2023    Procedure: CYSTOSCOPY, WITH SUPRAPUBIC CATHETER INSERTION;  Surgeon: Todd Trevino MD;  [place] [facility] [place]      No Known Allergies   Social History     Tobacco Use    Smoking status: Every Day     Types: Cigarettes, Vaping Device    Smokeless tobacco: Former   Substance Use Topics    Alcohol use: Not on file      Wt Readings from Last 1 Encounters:   23 63.5 kg (140 lb)        Anesthesia Evaluation   Pt has had prior anesthetic. Type: MAC.    No history of anesthetic complications       ROS/MED HX  ENT/Pulmonary:     (+)                tobacco use (currently vapes), Past use,                       Neurologic: Comment: Hx of spina bifida, paraplegia, neurogenic bladder   (-) no seizures and no CVA   Cardiovascular:  - neg cardiovascular ROS     METS/Exercise Tolerance:     Hematologic:  - neg hematologic  ROS     Musculoskeletal:       GI/Hepatic:     (+) GERD,                   Renal/Genitourinary: Comment: Neurogenic bladder s/p ileal conduit and then conduit removal, cystectomy, with creation of continent pouch   (-) renal disease   Endo:  - neg endo ROS     Psychiatric/Substance Use:  - neg psychiatric ROS     Infectious Disease:  - neg infectious disease ROS     Malignancy:  - neg malignancy ROS     Other:            Physical Exam    Airway        Mallampati: I   TM distance: > 3 FB   Neck ROM: full   Mouth opening: > 3 cm    Respiratory Devices and Support         Dental       (+) Modest Abnormalities - crowns, retainers, 1 or 2 missing teeth    B=Bridge, C=Chipped, L=Loose, M=Missing    Cardiovascular  "         Rhythm and rate: regular and normal     Pulmonary           breath sounds clear to auscultation           OUTSIDE LABS:  CBC: No results found for: \"WBC\", \"HGB\", \"HCT\", \"PLT\"  BMP: No results found for: \"NA\", \"POTASSIUM\", \"CHLORIDE\", \"CO2\", \"BUN\", \"CR\", \"GLC\"  COAGS: No results found for: \"PTT\", \"INR\", \"FIBR\"  POC: No results found for: \"BGM\", \"HCG\", \"HCGS\"  HEPATIC: No results found for: \"ALBUMIN\", \"PROTTOTAL\", \"ALT\", \"AST\", \"GGT\", \"ALKPHOS\", \"BILITOTAL\", \"BILIDIRECT\", \"JACLYN\"  OTHER: No results found for: \"PH\", \"LACT\", \"A1C\", \"DRU\", \"PHOS\", \"MAG\", \"LIPASE\", \"AMYLASE\", \"TSH\", \"T4\", \"T3\", \"CRP\", \"SED\"    Anesthesia Plan    ASA Status:  2       Anesthesia Type: General.     - Airway: ETT   Induction: Intravenous, Propofol.   Maintenance: Balanced.   Techniques and Equipment:     - Lines/Monitors: BIS, 2nd IV     - Blood: T&S     Consents          - Extended Intubation/Ventilatory Support Discussed: No.      - Patient is DNR/DNI Status: No     Use of blood products discussed: No .     Postoperative Care    Pain management: IV analgesics, Oral pain medications, Multi-modal analgesia.   PONV prophylaxis: Ondansetron (or other 5HT-3), Dexamethasone or Solumedrol     Comments:    Other Comments: I discussed the risks and benefits of general anesthesia with the patient.  Questions were sought and answered.      Isaac Posey MD  Attending Anesthesiologist               HENRRY VILLAFANA MD    I have reviewed the pertinent notes and labs in the chart from the past 30 days and (re)examined the patient.  Any updates or changes from those notes are reflected in this note.                  "

## 2024-01-18 ENCOUNTER — ANESTHESIA (OUTPATIENT)
Dept: SURGERY | Facility: CLINIC | Age: 59
End: 2024-01-18
Payer: MEDICARE

## 2024-01-18 ENCOUNTER — HOSPITAL ENCOUNTER (OUTPATIENT)
Facility: CLINIC | Age: 59
Discharge: HOME | End: 2024-01-18
Attending: UROLOGY | Admitting: UROLOGY
Payer: MEDICARE

## 2024-01-18 VITALS
SYSTOLIC BLOOD PRESSURE: 135 MMHG | TEMPERATURE: 98 F | DIASTOLIC BLOOD PRESSURE: 75 MMHG | OXYGEN SATURATION: 98 % | RESPIRATION RATE: 20 BRPM | HEART RATE: 85 BPM | HEIGHT: 64 IN | WEIGHT: 140 LBS | BODY MASS INDEX: 23.9 KG/M2

## 2024-01-18 DIAGNOSIS — Q05.4 SPINA BIFIDA WITH HYDROCEPHALUS, UNSPECIFIED SPINAL REGION (H): Primary | ICD-10-CM

## 2024-01-18 LAB
ABO/RH(D): NORMAL
ANTIBODY SCREEN: NEGATIVE
SPECIMEN EXPIRATION DATE: NORMAL

## 2024-01-18 PROCEDURE — 250N000013 HC RX MED GY IP 250 OP 250 PS 637: Performed by: STUDENT IN AN ORGANIZED HEALTH CARE EDUCATION/TRAINING PROGRAM

## 2024-01-18 PROCEDURE — 51999 UNLISTED LAPS PX BLADDER: CPT | Mod: GC | Performed by: UROLOGY

## 2024-01-18 PROCEDURE — 999N000141 HC STATISTIC PRE-PROCEDURE NURSING ASSESSMENT: Performed by: UROLOGY

## 2024-01-18 PROCEDURE — 250N000011 HC RX IP 250 OP 636: Performed by: STUDENT IN AN ORGANIZED HEALTH CARE EDUCATION/TRAINING PROGRAM

## 2024-01-18 PROCEDURE — 36415 COLL VENOUS BLD VENIPUNCTURE: CPT | Performed by: STUDENT IN AN ORGANIZED HEALTH CARE EDUCATION/TRAINING PROGRAM

## 2024-01-18 PROCEDURE — 258N000003 HC RX IP 258 OP 636: Performed by: STUDENT IN AN ORGANIZED HEALTH CARE EDUCATION/TRAINING PROGRAM

## 2024-01-18 PROCEDURE — 710N000012 HC RECOVERY PHASE 2, PER MINUTE: Performed by: UROLOGY

## 2024-01-18 PROCEDURE — 360N000080 HC SURGERY LEVEL 7, PER MIN: Performed by: UROLOGY

## 2024-01-18 PROCEDURE — 250N000025 HC SEVOFLURANE, PER MIN: Performed by: UROLOGY

## 2024-01-18 PROCEDURE — 250N000009 HC RX 250: Performed by: UROLOGY

## 2024-01-18 PROCEDURE — 710N000010 HC RECOVERY PHASE 1, LEVEL 2, PER MIN: Performed by: UROLOGY

## 2024-01-18 PROCEDURE — 250N000011 HC RX IP 250 OP 636: Mod: JZ | Performed by: STUDENT IN AN ORGANIZED HEALTH CARE EDUCATION/TRAINING PROGRAM

## 2024-01-18 PROCEDURE — 370N000017 HC ANESTHESIA TECHNICAL FEE, PER MIN: Performed by: UROLOGY

## 2024-01-18 PROCEDURE — 250N000011 HC RX IP 250 OP 636: Performed by: ANESTHESIOLOGY

## 2024-01-18 PROCEDURE — 272N000001 HC OR GENERAL SUPPLY STERILE: Performed by: UROLOGY

## 2024-01-18 PROCEDURE — 86900 BLOOD TYPING SEROLOGIC ABO: CPT | Performed by: STUDENT IN AN ORGANIZED HEALTH CARE EDUCATION/TRAINING PROGRAM

## 2024-01-18 PROCEDURE — 250N000009 HC RX 250: Performed by: STUDENT IN AN ORGANIZED HEALTH CARE EDUCATION/TRAINING PROGRAM

## 2024-01-18 PROCEDURE — C1769 GUIDE WIRE: HCPCS | Performed by: UROLOGY

## 2024-01-18 RX ORDER — ONDANSETRON 2 MG/ML
4 INJECTION INTRAMUSCULAR; INTRAVENOUS EVERY 30 MIN PRN
Status: DISCONTINUED | OUTPATIENT
Start: 2024-01-18 | End: 2024-01-18 | Stop reason: HOSPADM

## 2024-01-18 RX ORDER — OXYCODONE HYDROCHLORIDE 10 MG/1
10 TABLET ORAL
Status: COMPLETED | OUTPATIENT
Start: 2024-01-18 | End: 2024-01-18

## 2024-01-18 RX ORDER — HYDROMORPHONE HYDROCHLORIDE 1 MG/ML
0.4 INJECTION, SOLUTION INTRAMUSCULAR; INTRAVENOUS; SUBCUTANEOUS EVERY 5 MIN PRN
Status: DISCONTINUED | OUTPATIENT
Start: 2024-01-18 | End: 2024-01-18 | Stop reason: HOSPADM

## 2024-01-18 RX ORDER — SODIUM CHLORIDE, SODIUM LACTATE, POTASSIUM CHLORIDE, CALCIUM CHLORIDE 600; 310; 30; 20 MG/100ML; MG/100ML; MG/100ML; MG/100ML
INJECTION, SOLUTION INTRAVENOUS CONTINUOUS
Status: DISCONTINUED | OUTPATIENT
Start: 2024-01-18 | End: 2024-01-18 | Stop reason: HOSPADM

## 2024-01-18 RX ORDER — CEFAZOLIN SODIUM/WATER 2 G/20 ML
2 SYRINGE (ML) INTRAVENOUS
Status: DISCONTINUED | OUTPATIENT
Start: 2024-01-18 | End: 2024-01-18

## 2024-01-18 RX ORDER — FENTANYL CITRATE 50 UG/ML
INJECTION, SOLUTION INTRAMUSCULAR; INTRAVENOUS PRN
Status: DISCONTINUED | OUTPATIENT
Start: 2024-01-18 | End: 2024-01-18

## 2024-01-18 RX ORDER — FENTANYL CITRATE 50 UG/ML
25 INJECTION, SOLUTION INTRAMUSCULAR; INTRAVENOUS ONCE
Status: COMPLETED | OUTPATIENT
Start: 2024-01-18 | End: 2024-01-18

## 2024-01-18 RX ORDER — LIDOCAINE HYDROCHLORIDE AND EPINEPHRINE 10; 10 MG/ML; UG/ML
INJECTION, SOLUTION INFILTRATION; PERINEURAL PRN
Status: DISCONTINUED | OUTPATIENT
Start: 2024-01-18 | End: 2024-01-18 | Stop reason: HOSPADM

## 2024-01-18 RX ORDER — PROPOFOL 10 MG/ML
INJECTION, EMULSION INTRAVENOUS PRN
Status: DISCONTINUED | OUTPATIENT
Start: 2024-01-18 | End: 2024-01-18

## 2024-01-18 RX ORDER — GABAPENTIN 300 MG/1
300 CAPSULE ORAL
Status: DISCONTINUED | OUTPATIENT
Start: 2024-01-18 | End: 2024-01-18

## 2024-01-18 RX ORDER — ERTAPENEM 1 G/1
1 INJECTION, POWDER, LYOPHILIZED, FOR SOLUTION INTRAMUSCULAR; INTRAVENOUS EVERY 24 HOURS
Status: DISCONTINUED | OUTPATIENT
Start: 2024-01-18 | End: 2024-01-18 | Stop reason: HOSPADM

## 2024-01-18 RX ORDER — HYDROMORPHONE HYDROCHLORIDE 1 MG/ML
0.2 INJECTION, SOLUTION INTRAMUSCULAR; INTRAVENOUS; SUBCUTANEOUS EVERY 5 MIN PRN
Status: DISCONTINUED | OUTPATIENT
Start: 2024-01-18 | End: 2024-01-18 | Stop reason: HOSPADM

## 2024-01-18 RX ORDER — OXYCODONE HYDROCHLORIDE 5 MG/1
5 TABLET ORAL EVERY 6 HOURS PRN
Qty: 12 TABLET | Refills: 0 | Status: SHIPPED | OUTPATIENT
Start: 2024-01-18 | End: 2024-01-21

## 2024-01-18 RX ORDER — OXYCODONE HYDROCHLORIDE 5 MG/1
5 TABLET ORAL
Status: COMPLETED | OUTPATIENT
Start: 2024-01-18 | End: 2024-01-18

## 2024-01-18 RX ORDER — LIDOCAINE HYDROCHLORIDE 20 MG/ML
INJECTION, SOLUTION INFILTRATION; PERINEURAL PRN
Status: DISCONTINUED | OUTPATIENT
Start: 2024-01-18 | End: 2024-01-18

## 2024-01-18 RX ORDER — ACETAMINOPHEN 325 MG/1
975 TABLET ORAL ONCE
Status: COMPLETED | OUTPATIENT
Start: 2024-01-18 | End: 2024-01-18

## 2024-01-18 RX ORDER — SODIUM CHLORIDE, SODIUM LACTATE, POTASSIUM CHLORIDE, CALCIUM CHLORIDE 600; 310; 30; 20 MG/100ML; MG/100ML; MG/100ML; MG/100ML
INJECTION, SOLUTION INTRAVENOUS CONTINUOUS PRN
Status: DISCONTINUED | OUTPATIENT
Start: 2024-01-18 | End: 2024-01-18

## 2024-01-18 RX ORDER — CEFAZOLIN SODIUM/WATER 2 G/20 ML
2 SYRINGE (ML) INTRAVENOUS SEE ADMIN INSTRUCTIONS
Status: DISCONTINUED | OUTPATIENT
Start: 2024-01-18 | End: 2024-01-18

## 2024-01-18 RX ORDER — FENTANYL CITRATE 50 UG/ML
25 INJECTION, SOLUTION INTRAMUSCULAR; INTRAVENOUS EVERY 5 MIN PRN
Status: DISCONTINUED | OUTPATIENT
Start: 2024-01-18 | End: 2024-01-18 | Stop reason: HOSPADM

## 2024-01-18 RX ORDER — OXYBUTYNIN CHLORIDE 5 MG/1
5 TABLET ORAL 3 TIMES DAILY
COMMUNITY

## 2024-01-18 RX ORDER — FENTANYL CITRATE 50 UG/ML
25-50 INJECTION, SOLUTION INTRAMUSCULAR; INTRAVENOUS ONCE
Status: COMPLETED | OUTPATIENT
Start: 2024-01-18 | End: 2024-01-18

## 2024-01-18 RX ORDER — DEXAMETHASONE SODIUM PHOSPHATE 4 MG/ML
INJECTION, SOLUTION INTRA-ARTICULAR; INTRALESIONAL; INTRAMUSCULAR; INTRAVENOUS; SOFT TISSUE PRN
Status: DISCONTINUED | OUTPATIENT
Start: 2024-01-18 | End: 2024-01-18

## 2024-01-18 RX ORDER — ONDANSETRON 2 MG/ML
INJECTION INTRAMUSCULAR; INTRAVENOUS PRN
Status: DISCONTINUED | OUTPATIENT
Start: 2024-01-18 | End: 2024-01-18

## 2024-01-18 RX ORDER — ONDANSETRON 4 MG/1
4 TABLET, ORALLY DISINTEGRATING ORAL EVERY 30 MIN PRN
Status: DISCONTINUED | OUTPATIENT
Start: 2024-01-18 | End: 2024-01-18 | Stop reason: HOSPADM

## 2024-01-18 RX ORDER — LIDOCAINE 40 MG/G
CREAM TOPICAL
Status: DISCONTINUED | OUTPATIENT
Start: 2024-01-18 | End: 2024-01-18 | Stop reason: HOSPADM

## 2024-01-18 RX ORDER — AMOXICILLIN 250 MG
1 CAPSULE ORAL DAILY
Qty: 30 TABLET | Refills: 0 | Status: SHIPPED | OUTPATIENT
Start: 2024-01-18

## 2024-01-18 RX ORDER — FENTANYL CITRATE 50 UG/ML
50 INJECTION, SOLUTION INTRAMUSCULAR; INTRAVENOUS EVERY 5 MIN PRN
Status: DISCONTINUED | OUTPATIENT
Start: 2024-01-18 | End: 2024-01-18 | Stop reason: HOSPADM

## 2024-01-18 RX ADMIN — SODIUM CHLORIDE, POTASSIUM CHLORIDE, SODIUM LACTATE AND CALCIUM CHLORIDE: 600; 310; 30; 20 INJECTION, SOLUTION INTRAVENOUS at 07:44

## 2024-01-18 RX ADMIN — HYDROMORPHONE HYDROCHLORIDE 0.5 MG: 1 INJECTION, SOLUTION INTRAMUSCULAR; INTRAVENOUS; SUBCUTANEOUS at 09:29

## 2024-01-18 RX ADMIN — FENTANYL CITRATE 25 MCG: 50 INJECTION, SOLUTION INTRAMUSCULAR; INTRAVENOUS at 13:41

## 2024-01-18 RX ADMIN — PROPOFOL 20 MG: 10 INJECTION, EMULSION INTRAVENOUS at 10:49

## 2024-01-18 RX ADMIN — Medication 50 MG: at 07:51

## 2024-01-18 RX ADMIN — OXYCODONE HYDROCHLORIDE 5 MG: 5 TABLET ORAL at 13:37

## 2024-01-18 RX ADMIN — LIDOCAINE HYDROCHLORIDE 60 MG: 20 INJECTION, SOLUTION INFILTRATION; PERINEURAL at 07:51

## 2024-01-18 RX ADMIN — SUGAMMADEX 200 MG: 100 INJECTION, SOLUTION INTRAVENOUS at 10:42

## 2024-01-18 RX ADMIN — Medication 10 MG: at 10:20

## 2024-01-18 RX ADMIN — ERTAPENEM SODIUM 1 G: 1 INJECTION, POWDER, LYOPHILIZED, FOR SOLUTION INTRAMUSCULAR; INTRAVENOUS at 07:58

## 2024-01-18 RX ADMIN — ACETAMINOPHEN 975 MG: 325 TABLET, FILM COATED ORAL at 07:15

## 2024-01-18 RX ADMIN — ONDANSETRON 4 MG: 2 INJECTION INTRAMUSCULAR; INTRAVENOUS at 10:39

## 2024-01-18 RX ADMIN — FENTANYL CITRATE 25 MCG: 50 INJECTION, SOLUTION INTRAMUSCULAR; INTRAVENOUS at 13:49

## 2024-01-18 RX ADMIN — FENTANYL CITRATE 50 MCG: 50 INJECTION, SOLUTION INTRAMUSCULAR; INTRAVENOUS at 11:09

## 2024-01-18 RX ADMIN — OXYCODONE HYDROCHLORIDE 5 MG: 5 TABLET ORAL at 11:40

## 2024-01-18 RX ADMIN — FENTANYL CITRATE 50 MCG: 50 INJECTION INTRAMUSCULAR; INTRAVENOUS at 08:20

## 2024-01-18 RX ADMIN — Medication 20 MG: at 08:28

## 2024-01-18 RX ADMIN — FENTANYL CITRATE 50 MCG: 50 INJECTION, SOLUTION INTRAMUSCULAR; INTRAVENOUS at 11:20

## 2024-01-18 RX ADMIN — HYDROMORPHONE HYDROCHLORIDE 0.5 MG: 1 INJECTION, SOLUTION INTRAMUSCULAR; INTRAVENOUS; SUBCUTANEOUS at 10:27

## 2024-01-18 RX ADMIN — PROPOFOL 200 MG: 10 INJECTION, EMULSION INTRAVENOUS at 07:51

## 2024-01-18 RX ADMIN — DEXAMETHASONE SODIUM PHOSPHATE 4 MG: 4 INJECTION, SOLUTION INTRA-ARTICULAR; INTRALESIONAL; INTRAMUSCULAR; INTRAVENOUS; SOFT TISSUE at 07:51

## 2024-01-18 RX ADMIN — FENTANYL CITRATE 50 MCG: 50 INJECTION INTRAMUSCULAR; INTRAVENOUS at 07:50

## 2024-01-18 ASSESSMENT — ACTIVITIES OF DAILY LIVING (ADL)
ADLS_ACUITY_SCORE: 23
ADLS_ACUITY_SCORE: 33

## 2024-01-18 NOTE — ANESTHESIA CARE TRANSFER NOTE
Patient: Roshan Alan    Procedure: Procedure(s):  ROBOTIC ASSISTED LAPAROSCOPIC REVISION OF ILEAL CONTINENT CATHETERIZABLE CHANNEL Latex Free       Diagnosis: Neurogenic bladder [N31.9]  Diagnosis Additional Information: No value filed.    Anesthesia Type:   General     Note:    Oropharynx: oropharynx clear of all foreign objects and spontaneously breathing  Level of Consciousness: awake  Oxygen Supplementation: face mask  Level of Supplemental Oxygen (L/min / FiO2): 6  Independent Airway: airway patency satisfactory and stable  Dentition: dentition unchanged  Vital Signs Stable: post-procedure vital signs reviewed and stable  Report to RN Given: handoff report given  Patient transferred to: PACU    Handoff Report: Identifed the Patient, Identified the Reponsible Provider, Reviewed the pertinent medical history, Discussed the surgical course, Reviewed Intra-OP anesthesia mangement and issues during anesthesia, Set expectations for post-procedure period and Allowed opportunity for questions and acknowledgement of understanding      Vitals:  Vitals Value Taken Time   /87 01/18/24 1101   Temp 36.2    Pulse 79 01/18/24 1111   Resp 5 01/18/24 1111   SpO2 98 % 01/18/24 1111   Vitals shown include unfiled device data.    Electronically Signed By: HENRRY VILLAFANA MD  January 18, 2024  11:12 AM

## 2024-01-18 NOTE — ANESTHESIA PROCEDURE NOTES
Airway       Patient location during procedure: OR       Procedure Start/Stop Times: 1/18/2024 7:56 AM  Staff -        Anesthesiologist:  Isaac Posey MD       Resident/Fellow: Lindsey Black MD       Other Anesthesia Staff: Jaqueline Oneal MD       Performed By: resident  Consent for Airway        Urgency: elective  Indications and Patient Condition       Indications for airway management: sanchez-procedural       Induction type:intravenous       Mask difficulty assessment: 2 - vent by mask + OA or adjuvant +/- NMBA    Final Airway Details       Final airway type: endotracheal airway       Successful airway: ETT - single and Oral  Endotracheal Airway Details        ETT size (mm): 7.0       Cuffed: yes       Successful intubation technique: direct laryngoscopy       DL Blade Type: MAC 4       Grade View of Cords: 1       Adjucts: stylet       Position: Right       Measured from: lips       Secured at (cm): 22       Bite block used: Soft    Post intubation assessment        Placement verified by: capnometry, equal breath sounds and chest rise        Number of attempts at approach: 1       Secured with: tape       Ease of procedure: easy       Dentition: Intact and Unchanged    Medication(s) Administered   Medication Administration Time: 1/18/2024 7:56 AM

## 2024-01-18 NOTE — OP NOTE
OPERATIVE NOTE    PREOPERATIVE DIAGNOSIS:   1. Redundancy of catheterizable channel     POSTOPERATIVE DIAGNOSIS:   same     PROCEDURES PERFORMED:  1. Robotic-assisted laparoscopic complex revision of ileal catheterizable channel     STAFF SURGEON: Todd Trevino MD  FELLOW: Scottie Gotti MD    RESIDENT: Rudi Rodriguez MD    ANESTHESIA: General.     ESTIMATED BLOOD LOSS: 20 mL.     DRAINS AND TUBES:   1. 16 Fr SPT Duron catheter.   2. 14 Fr channel catheter     SPECIMENS: None  COMPLICATIONS: None     SIGNIFICANT FINDINGS: Redundancy/diverticulum of proximal catheterizable channel just before entry into pouch, successfully reduced and tapered with intact reflux mechanism.     PREOPERATIVE INDICATIONS FOR THE PROCEDURE:  Roshan Alan is a 58 year old man with shunted SB with catheterizable channel. He had difficulty cathing requiring indwelling catheter with changes over a wire weekly. He underwent an SPT placement on 10/11/23 to give channel a rest prior to cysto of channel.      The patient desires definitive surgical repair. After long discussion of the risks and benefits of ureteral reimplant versus serial nephrostomy tube changes, the patient elected to proceed with left ureteral reconstruction.      DESCRIPTION OF PROCEDURE: After obtaining informed consent, the patient was brought to the operating room, placed in supine position on operating table. After induction of general anesthesia, the patient was placed in supine position with both arms tucked. He was secured to the table with Velcro straps with care taken to make sure all bony prominences were padded adequately. He was prepped and draped in the standard sterile fashion and given ertapenem. A surgical timeout was performed. A 16F duron was placed on the field into the bladder through the SPT and catheter was placed in channel to the point of resistance.     An 8mm incision was made above the umbilicus and intraperitoneal access was obtained using a Veress  needle. Position was confirmed with a drop test and the abdominal was insufflated. The Veress needle was removed, and an 8mm trochar was placed. A camera was placed and full inspection of the abdomen revealed no injury where the needle was inserted.  Three more robotic ports and one assistant port were placed under direct vision. The pressure was dropped to 15mmHg.    The channel was identified and following lysis of adhesions, was looped. Dissection was carried to drop as much of the bladder was possible.    We then performed cystoscopy through channel using the light of the scope to detect the path of the channel. It became clear that there was a large redundancy and outpouching of the channel just before entering the bladder that was the cause of difficult catheterizations.We also noted large amount of debris in the bladder.     We first attempted to taper the region with space 3-0 PDS sutures on the outside of either side of the channel, however scope did not show resolution of outpouching and catheter still did not pass easily.    We then elected to open the channel along the length of the presumed diverticulum. Redundant tissue was excised from both sides of the channel. During resection, a small 1-2cm length of pouch was entered. This was found to be on the other side of the continence valve and was closed in two layers with running 3-0 PDS without issue.  We filled the bladder to ensure that continence mechanism was still intact and following no leakage from bladder,  the channel was then closed circumferentially with interrupted and running 3-0 PDS.   The catheter then passed easily into the channel.     The abdomen was inspected and hemostasis was satisfactory.  All the ports were removed. Counts were correct. The skin was closed with subcuticular 4-0 monocryl suture. Dermabond was applied. The two catheters were put to gravity.  The patient was awoken and sent to the PACU without issue.       Scottie  Shen  Urology    As the attending surgeon I, Todd Trevino, was present and scrubbed throughout the procedure.

## 2024-01-18 NOTE — ANESTHESIA POSTPROCEDURE EVALUATION
Patient: Roshan Alan    Procedure: Procedure(s):  ROBOTIC ASSISTED LAPAROSCOPIC REVISION OF ILEAL CONTINENT CATHETERIZABLE CHANNEL Latex Free       Anesthesia Type:  General    Note:  Disposition: Outpatient   Postop Pain Control: Uneventful            Sign Out: Well controlled pain   PONV: No   Neuro/Psych: Uneventful            Sign Out: Acceptable/Baseline neuro status   Airway/Respiratory: Uneventful            Sign Out: Acceptable/Baseline resp. status   CV/Hemodynamics: Uneventful            Sign Out: Acceptable CV status; No obvious hypovolemia; No obvious fluid overload   Other NRE: NONE   DID A NON-ROUTINE EVENT OCCUR? No           Last vitals:  Vitals Value Taken Time   /89 01/18/24 1130   Temp 36.2  C (97.2  F) 01/18/24 1130   Pulse 75 01/18/24 1135   Resp 12 01/18/24 1130   SpO2 98 % 01/18/24 1135   Vitals shown include unfiled device data.    Electronically Signed By: Flaquito Paredes MD  January 18, 2024  11:37 AM

## 2024-01-18 NOTE — BRIEF OP NOTE
[facility]    Brief Operative Note    Pre-operative diagnosis: Neurogenic bladder [N31.9]  Post-operative diagnosis Same as pre-operative diagnosis    Procedure: ROBOTIC ASSISTED LAPAROSCOPIC REVISION OF ILEAL CONTINENT CATHETERIZABLE CHANNEL Latex Free, N/A - Abdomen    Surgeon: Surgeon(s) and Role:     * Todd Trevino MD - Primary     * Rudi Rodriguez MD - Resident - Assisting     * Scottie Gotti MD - Fellow - Assisting  Anesthesia: General   Estimated Blood Loss: Minimal    Drains: 14F mitrofanoff catheter and 16F suprapubic  Specimens: * No specimens in log *  Findings:   Able to reduce redundancy of channel and re-anastomose channel to pouch while sparing IC valve. Entire case was robotic.  .  Complications: None.  Implants: * No implants in log *

## 2024-01-18 NOTE — OR NURSING
Pt holding in 3C until brother Jerrell available to transport pt. Pt sleeping in room. States pain is under control.

## 2024-01-18 NOTE — OR NURSING
C/o pain 6/10 2 hours post dose of Oxycodone. Received Oxycodone and order from MD for Fentanyl until dose of Oxycodone takes effect. Pt states his pain level is now 2. Resting comfortably in recliner until brother is able to get discharge instructions and .

## 2024-01-18 NOTE — DISCHARGE INSTRUCTIONS
Contacting your Doctor -   To contact a doctor, call [phone] (Dr. [facility] number) or:  [phone] and ask for the resident on call for Urology (answered 24 hours a day)   Emergency Department:  [facility]: [phone]  [facility]: [phone] 911 if you are in need of immediate or emergent help   Routine, [facility], [place] - Same-Day Surgery Adult Discharge Orders & Instructions For 24 hours after surgery: Get plenty of rest. A responsible adult must stay with you for at least 24 hours after you leave the hospital. Do not drive or use heavy equipment. If you have weakness or tingling, don't drive or use heavy equipment until this feeling goes away. Do not drink alcohol for a minimum of 24 hours after surgery. Avoid strenuous or risky activities. Ask for help when climbing stairs. You may feel lightheaded. IF so, sit for a few minutes before standing. Have someone help you get up. You may have a slight fever. Call the doctor if your fever is over 100 F (37.7 C) (taken under the tongue) or lasts longer than 24 hours. You may have a dry mouth, a sore throat, muscle aches or trouble sleeping. These should go away after 24 hours. Do not make important or legal decisions. Diet - If you have nausea (feel sick to your stomach): Drink only clear liquids such as apple juice, ginger ale, broth or 7-Up. Rest may also help. Be sure to drink enough fluids. Move to a regular diet as you feel able. Activity - No strenuous exercise for 4 weeks. - No lifting, pushing, pulling more than 10 pounds for 4 weeks. - Do not strain with bowel movements. - Do not drive until you can press the brake pedal quickly and fully without pain. - Do not operate a motor vehicle while taking narcotic pain medications. Incisions - You may shower and get incisions wet starting 48 hrs after surgery. - Do not scrub incisions or submerge wounds for 2 weeks or until seen in follow-up. - Remove  "wound dressing 48 hours after surgery (you may not have a dressing). - If purple dermabond glue was used, avoid applying any lotions or ointments. - If steri-strips were used, they will fall off on their own. - Leave incision open to air. Cover with gauze only if needed for comfort or to protect clothing from drainage. - The stitches do not need to be removed, they will dissolve on their own. Medications - Transition from narcotic pain medications to tylenol (acetaminophen) as you are able. Wean yourself off all pain medications as you are able. - Some pain medications contain both tylenol (acetaminophen) and a narcotic (Norco, vicodin, percocet), do not take more than 4,000mg of Tylenol (acetaminophen) from all sources in any 24 hour period. - Narcotics can make you constipated. Take over the counter fiber (metamucil or benefiber) and stool softeners (miralax, docusate or senna) while taking narcotic pain medications, but stop if you develop diarrhea. - No driving or operating machinery while taking narcotic pain medications Catheter care - Please irrigate both catheters twice per day to keep open until you see Dr. Trevino in follow up Follow-Up: - Follow-up with Dr. Trevino and Dr. Gotti on 2/12/2024 - Call or return sooner than your regularly scheduled visit if you develop any of the following: fever (greater than 101.5), uncontrolled pain, uncontrolled nausea or vomiting, as well as increased redness, swelling, or drainage from your wound. Phone numbers: - Monday through Friday 8am to 4:30pm: Call Dr. Uriel Rai's nurse at [phone] with questions, requests for medication refills, or to schedule or confirm an appointment. Alternatively you may try [phone] to reach the urology clinic. - Nights or weekends: call the after hours emergency pager - [phone] and tell the  \"I would like to page the Urology Resident on call.\" Please note, due to prescribing laws, resident physicians are " unable to prescribe narcotics after-hours. If you feel as though you will need a refill of a narcotic pain medication, you will need to call the clinic during business hours OR seek emergency care. - For emergencies, call 911

## 2024-01-19 ENCOUNTER — TELEPHONE (OUTPATIENT)
Dept: UROLOGY | Facility: CLINIC | Age: 59
End: 2024-01-19
Payer: MEDICARE

## 2024-01-19 NOTE — TELEPHONE ENCOUNTER
Called Roshan to check in after surgery.  He states things are going well, he was able to get irrigation supplies from [facility].  He is aware of follow up cysto and will plan on keeping that appointment.      Maureen Ingram RN   4:04 PM

## 2024-01-23 ENCOUNTER — TELEPHONE (OUTPATIENT)
Dept: UROLOGY | Facility: CLINIC | Age: 59
End: 2024-01-23
Payer: MEDICARE

## 2024-01-23 NOTE — TELEPHONE ENCOUNTER
Left Voicemail (1st Attempt) and Sent Mychart (1st Attempt) for the patient to call back and schedule the following:    Appointment type: Cysto/Post Op  Provider: Dr. Gotti  Return date: Feb 9th or 23rd  Specialty phone number: [phone]  Additional appointment(s) needed: N/A  Additonal Notes: Provider not in clinic, need to reschedule Feb 12th appointment

## 2024-02-07 ENCOUNTER — PRE VISIT (OUTPATIENT)
Dept: UROLOGY | Facility: CLINIC | Age: 59
End: 2024-02-07
Payer: MEDICARE

## 2024-02-07 NOTE — TELEPHONE ENCOUNTER
Reason for visit: cystoscopy - follow up    Relevant information: revision ileal continent catheterizable channel 1/18/24    Records/imaging/labs/orders: in epic    Pt called: No need for a call    At Rooming: prep ileostomy site    Weston Daily  2/7/2024  11:45 AM

## 2024-02-23 ENCOUNTER — OFFICE VISIT (OUTPATIENT)
Dept: UROLOGY | Facility: CLINIC | Age: 59
End: 2024-02-23
Payer: MEDICARE

## 2024-02-23 VITALS
BODY MASS INDEX: 23.9 KG/M2 | WEIGHT: 140 LBS | DIASTOLIC BLOOD PRESSURE: 87 MMHG | HEIGHT: 64 IN | HEART RATE: 75 BPM | SYSTOLIC BLOOD PRESSURE: 146 MMHG

## 2024-02-23 DIAGNOSIS — N31.9 NEUROGENIC BLADDER: Primary | ICD-10-CM

## 2024-02-23 PROCEDURE — 99024 POSTOP FOLLOW-UP VISIT: CPT | Performed by: STUDENT IN AN ORGANIZED HEALTH CARE EDUCATION/TRAINING PROGRAM

## 2024-02-23 ASSESSMENT — PAIN SCALES - GENERAL: PAINLEVEL: MILD PAIN (3)

## 2024-02-23 NOTE — LETTER
2/23/2024       RE: Roshan Alan  [address] [facility] [address]     Dear Colleague,    Thank you for referring your patient, Roshan Alan, to the [facility] [place] [facility]. Please see a copy of my visit note below.    Male Cystoscopy Procedure Note     PRE-PROCEDURE DIAGNOSIS: difficulty catheterizing cath channel   POST-PROCEDURE DIAGNOSIS: same  PROCEDURE: cystoscopy    HISTORY: Roshan Alan is a 58 year old man with shunted SB with catheterizable channel. He had difficulty cathing requiring indwelling catheter with changes over a wire weekly. He underwent an SPT placement and then a robotic revision of his channel redundancy on 1/18/24. He returns for follow up.     DESCRIPTION OF PROCEDURE:  After informed consent was obtained, the patient was brought to the procedure room where he was placed in the supine position with all pressure points well padded.  The channel and SPT were prepped and draped in a sterile fashion. A flexible cystoscope was introduced through the channel. Redundancy was much improved from prior. Dissolvable suture still visualized. There was still a small area of tortuosity just distal to the valve. Pouch was free of  diverticuli, tumors or stones.The flexible cystoscope was removed and the findings were described to the patient. Catheter channel was catheterized with some very minor resistance just before the pouch. Patient was also able to demonstrate catheterization.    ASSESSMENT AND PLAN:  58 year old man with neurogenic bladder s/p ileal conduit and then conduit removal, cystectomy, with creation of continent pouch. Now s/p robotic revision of redundancy of his catheterizable channel. Redundancy improved and channel catheterized successfully. Given the small amount of resistance, will plan to have him start CIC per his regular schedule with SPT in place for 1 week. If things are going well, then  will remove SPT in a week at home.    Follow up annually or as needed, patient will call to schedule.      Again, thank you for allowing me to participate in the care of your patient.      Sincerely,    Scottie Gotti MD

## 2024-02-23 NOTE — NURSING NOTE
"Chief Complaint   Patient presents with    Cystoscopy       Blood pressure (!) 146/87, pulse 75, height 1.626 m (5' 4\"), weight 63.5 kg (140 lb). Body mass index is 24.03 kg/m .    Patient Active Problem List   Diagnosis    Neurogenic bladder    Spina bifida (H)       No Known Allergies    Current Outpatient Medications   Medication Sig Dispense Refill    loperamide (IMODIUM A-D) 2 MG tablet Take 2 mg by mouth 2 times daily as needed for diarrhea      omeprazole (PRILOSEC) 20 MG DR capsule Take 20 mg by mouth      oxyBUTYnin (DITROPAN) 5 MG tablet Take 5 mg by mouth 3 times daily (Patient not taking: Reported on 2024)      senna-docusate (SENOKOT-S/PERICOLACE) 8.6-50 MG tablet Take 1 tablet by mouth daily (Patient not taking: Reported on 2024) 30 tablet 0       Social History     Tobacco Use    Smoking status: Every Day     Types: Cigarettes, Vaping Device    Smokeless tobacco: Former       Invasive Procedure Safety Checklist:    Procedure: Cystoscopy    Action: Complete sections and checkboxes as appropriate.    Pre-procedure:  1. Patient ID Verified with 2 identifiers (Dorothy and  or MRN) : YES    2. Procedure and site verified with patient/designee (when able) : YES    3. Accurate consent documentation in medical record : YES    4. H&P (or appropriate assessment) documented in medical record : N/A  H&P must be up to 30 days prior to procedure an updated within 24 hours of                 Procedure as applicable.     5. Relevant diagnostic and radiology test results appropriately labeled and displayed as applicable : YES    6. Blood products, implants, devices, and/or special equipment available for the procedure as applicable : YES    7. Procedure site(s) marked with provider initials [Exclusions: none] : NO    8. Marking not required. Reason : Yes  Procedure does not require site marking    Time Out:     Time-Out performed immediately prior to starting procedure, including verbal and active " participation of all team members addressing: YES    1. Correct patient identity.  2. Confirmed that the correct side and site are marked.  3. An accurate procedure to be done.  4. Agreement on the procedure to be done.  5. Correct patient position.  6. Relevant images and results are properly labeled and appropriately displayed.  7. The need to administer antibiotics or fluids for irrigation purposes during the procedure as applicable.  8. Safety precautions based on patient history or medication use.    During Procedure: Verification of correct person, site, and procedure occurs any time the responsibility for care of the patient is transferred to another member of the care team.      Kasey Skinner  2/23/2024  12:57 PM

## 2024-02-23 NOTE — PROGRESS NOTES
Male Cystoscopy Procedure Note     PRE-PROCEDURE DIAGNOSIS: difficulty catheterizing cath channel   POST-PROCEDURE DIAGNOSIS: same  PROCEDURE: cystoscopy    HISTORY: Roshan Alan is a 58 year old man with shunted SB with catheterizable channel. He had difficulty cathing requiring indwelling catheter with changes over a wire weekly. He underwent an SPT placement and then a robotic revision of his channel redundancy on 1/18/24. He returns for follow up.     DESCRIPTION OF PROCEDURE:  After informed consent was obtained, the patient was brought to the procedure room where he was placed in the supine position with all pressure points well padded.  The channel and SPT were prepped and draped in a sterile fashion. A flexible cystoscope was introduced through the channel. Redundancy was much improved from prior. Dissolvable suture still visualized. There was still a small area of tortuosity just distal to the valve. Pouch was free of  diverticuli, tumors or stones.The flexible cystoscope was removed and the findings were described to the patient. Catheter channel was catheterized with some very minor resistance just before the pouch. Patient was also able to demonstrate catheterization.    ASSESSMENT AND PLAN:  58 year old man with neurogenic bladder s/p ileal conduit and then conduit removal, cystectomy, with creation of continent pouch. Now s/p robotic revision of redundancy of his catheterizable channel. Redundancy improved and channel catheterized successfully. Given the small amount of resistance, will plan to have him start CIC per his regular schedule with SPT in place for 1 week. If things are going well, then will remove SPT in a week at home.    Follow up annually or as needed, patient will call to schedule.

## 2024-12-15 ENCOUNTER — HEALTH MAINTENANCE LETTER (OUTPATIENT)
Age: 59
End: 2024-12-15

## (undated) DEVICE — GLOVE BIOGEL PI MICRO SZ 7.5 48575

## (undated) DEVICE — SU SILK 2-0 TIE 12X30" A305H

## (undated) DEVICE — GLOVE BIOGEL PI MICRO SZ 8.0 48580

## (undated) DEVICE — ESU PENCIL W/COATED BLADE E2450H

## (undated) DEVICE — GUIDEWIRE SENSOR DUAL FLEX STR 0.035"X150CM M0066703080

## (undated) DEVICE — CATH INTRODUCER SUPRAPUBIC 16FR SF-S16-851

## (undated) DEVICE — PAD CHUX UNDERPAD 30X36" P3036C

## (undated) DEVICE — SYR 30ML SLIP TIP W/O NDL 302833

## (undated) DEVICE — CATH FOLYSIL 14FR 10ML AA6114

## (undated) DEVICE — DAVINCI XI SEAL UNIVERSAL 5-8MM 470361

## (undated) DEVICE — DRAPE MAYO STAND 23X54 8337

## (undated) DEVICE — DAVINCI XI DRAPE ARM 470015

## (undated) DEVICE — CONNECTOR WATER VALVE PERFUSION PACK STR 020272801

## (undated) DEVICE — DRAPE TIBURON TOP SHEET 100X60" 29352

## (undated) DEVICE — LINEN TOWEL PACK X6 WHITE 5487

## (undated) DEVICE — SOL WATER IRRIG 1000ML BOTTLE 2F7114

## (undated) DEVICE — SU SILK 3-0 TIE 12X30" A304H

## (undated) DEVICE — KIT PATIENT POSITIONING PIGAZZI LATEX FREE 40580

## (undated) DEVICE — ESU ENDO SCISSORS 5MM CVD 5DCS

## (undated) DEVICE — TUBING FILTER TRI-LUMEN AIRSEAL ASC-EVAC1

## (undated) DEVICE — SU MONOCRYL 4-0 PS-2 27" UND Y426H

## (undated) DEVICE — Device

## (undated) DEVICE — DAVINCI XI ESU BIPOLAR 3MM ENDOWRIST FENESTRATED EXT 471205

## (undated) DEVICE — BLADE CLIPPER SGL USE 9680

## (undated) DEVICE — WIPES FOLEY CARE SURESTEP PROVON DFC100

## (undated) DEVICE — CATH FOLYSIL 16FR 15ML AA6116

## (undated) DEVICE — DAVINCI HOT SHEARS TIP COVER  400180

## (undated) DEVICE — SYR 10ML FINGER CONTROL W/O NDL 309695

## (undated) DEVICE — BAG URINARY DRAIN 4000ML LF 153509

## (undated) DEVICE — ENDO SEAL BX PORT BPS-A

## (undated) DEVICE — SYR PISTON URETHRAL 60ML 68000

## (undated) DEVICE — NDL INSUFFLATION 13GA 120MM C2201

## (undated) DEVICE — PACK DAVINCI UROL

## (undated) DEVICE — LINEN TOWEL PACK X30 5481

## (undated) DEVICE — SU PDS II 3-0 RB-1 27" Z305H

## (undated) DEVICE — ANTIFOG SOLUTION SEE SHARP 150M TROCAR SWABS 30978

## (undated) DEVICE — RAD KNIFE HANDLE W/11 BLADE DISPOSABLE 371611

## (undated) DEVICE — SYR PISTON IRRIGATION 60 ML DYND20325

## (undated) DEVICE — LINEN GOWN XLG 5407

## (undated) DEVICE — SUCTION MANIFOLD NEPTUNE 2 SYS 4 PORT 0702-020-000

## (undated) DEVICE — SOL NACL 0.9% INJ 1000ML BAG 2B1324X

## (undated) DEVICE — PREP POVIDONE-IODINE 7.5% SCRUB 4OZ BOTTLE MDS093945

## (undated) DEVICE — DAVINCI XI GRASPER PROGRASP 8MM EXT 471093

## (undated) DEVICE — DRAPE IOBAN INCISE 13X13" 6640EZ

## (undated) DEVICE — LINEN TOWEL PACK X5 5464

## (undated) DEVICE — PROTECTOR ARM ONE-STEP TRENDELENBURG 40418

## (undated) DEVICE — DRAPE SHEET REV FOLD 3/4 9349

## (undated) DEVICE — DRAPE IOBAN INCISE 23X17" 6650EZ

## (undated) DEVICE — DAVINCI XI DRAPE COLUMN 470341

## (undated) DEVICE — PACK GOWN 3/PK DISP XL SBA32GPFCB

## (undated) DEVICE — DAVINCI XI MONOPOLAR SCISSORS HOT SHEARS 8MM 470179

## (undated) DEVICE — PACK CYSTO CUSTOM ASC

## (undated) DEVICE — ADAPTER CATH CHECK-FLO 9FR FLL 050885 G15476

## (undated) DEVICE — DRAPE LAP W/ARMBOARD 29410

## (undated) DEVICE — SYR 10ML SLIP TIP W/O NDL 303134

## (undated) DEVICE — NDL 25GA 1.5" 305127

## (undated) DEVICE — TUBING IRR TUR Y TYPE 2C4041

## (undated) DEVICE — PROBE COVER INTRAOPERATIVE 5"X96" PC1308

## (undated) DEVICE — NDL PERC ACCESS 18GX20CM M0067001220

## (undated) DEVICE — PREP POVIDONE IODINE SOLUTION 10% 4OZ BOTTLE 29906-004

## (undated) DEVICE — PREP CHLORAPREP 26ML TINTED HI-LITE ORANGE 930815

## (undated) DEVICE — SYR 50ML LL W/O NDL 309653

## (undated) DEVICE — NDL COUNTER 20CT 31142493

## (undated) DEVICE — VESSEL LOOPS YELLOW MAXI 31145694

## (undated) DEVICE — SUTURE BOOTS 051003PBX

## (undated) DEVICE — ENDO TROCAR CONMED AIRSEAL BLADELESS 08X120MM IAS8-120LP

## (undated) DEVICE — GOWN XLG DISP 9545

## (undated) DEVICE — DRAPE U SPLIT 74X120" 29440

## (undated) DEVICE — DRSG GAUZE 4X4" 3033

## (undated) RX ORDER — DEXAMETHASONE SODIUM PHOSPHATE 4 MG/ML
INJECTION, SOLUTION INTRA-ARTICULAR; INTRALESIONAL; INTRAMUSCULAR; INTRAVENOUS; SOFT TISSUE
Status: DISPENSED
Start: 2024-01-18

## (undated) RX ORDER — CEFAZOLIN SODIUM 2 G/50ML
SOLUTION INTRAVENOUS
Status: DISPENSED
Start: 2023-10-11

## (undated) RX ORDER — FENTANYL CITRATE 50 UG/ML
INJECTION, SOLUTION INTRAMUSCULAR; INTRAVENOUS
Status: DISPENSED
Start: 2024-01-18

## (undated) RX ORDER — OXYCODONE HYDROCHLORIDE 5 MG/1
TABLET ORAL
Status: DISPENSED
Start: 2023-10-11

## (undated) RX ORDER — OXYCODONE HYDROCHLORIDE 5 MG/1
TABLET ORAL
Status: DISPENSED
Start: 2024-01-18

## (undated) RX ORDER — HYDROMORPHONE HYDROCHLORIDE 1 MG/ML
INJECTION, SOLUTION INTRAMUSCULAR; INTRAVENOUS; SUBCUTANEOUS
Status: DISPENSED
Start: 2024-01-18

## (undated) RX ORDER — PROPOFOL 10 MG/ML
INJECTION, EMULSION INTRAVENOUS
Status: DISPENSED
Start: 2023-10-11

## (undated) RX ORDER — PROPOFOL 10 MG/ML
INJECTION, EMULSION INTRAVENOUS
Status: DISPENSED
Start: 2024-01-18

## (undated) RX ORDER — GABAPENTIN 300 MG/1
CAPSULE ORAL
Status: DISPENSED
Start: 2024-01-18

## (undated) RX ORDER — ACETAMINOPHEN 325 MG/1
TABLET ORAL
Status: DISPENSED
Start: 2023-10-11

## (undated) RX ORDER — ACETAMINOPHEN 325 MG/1
TABLET ORAL
Status: DISPENSED
Start: 2024-01-18

## (undated) RX ORDER — CEFAZOLIN SODIUM/WATER 2 G/20 ML
SYRINGE (ML) INTRAVENOUS
Status: DISPENSED
Start: 2024-01-18

## (undated) RX ORDER — CIPROFLOXACIN 500 MG/1
TABLET, FILM COATED ORAL
Status: DISPENSED
Start: 2023-11-20

## (undated) RX ORDER — LIDOCAINE HYDROCHLORIDE AND EPINEPHRINE 10; 10 MG/ML; UG/ML
INJECTION, SOLUTION INFILTRATION; PERINEURAL
Status: DISPENSED
Start: 2024-01-18

## (undated) RX ORDER — ONDANSETRON 2 MG/ML
INJECTION INTRAMUSCULAR; INTRAVENOUS
Status: DISPENSED
Start: 2024-01-18

## (undated) RX ORDER — LIDOCAINE HYDROCHLORIDE AND EPINEPHRINE 10; 10 MG/ML; UG/ML
INJECTION, SOLUTION INFILTRATION; PERINEURAL
Status: DISPENSED
Start: 2023-10-11

## (undated) RX ORDER — FENTANYL CITRATE-0.9 % NACL/PF 10 MCG/ML
PLASTIC BAG, INJECTION (ML) INTRAVENOUS
Status: DISPENSED
Start: 2024-01-18